# Patient Record
Sex: FEMALE | Race: WHITE | NOT HISPANIC OR LATINO
[De-identification: names, ages, dates, MRNs, and addresses within clinical notes are randomized per-mention and may not be internally consistent; named-entity substitution may affect disease eponyms.]

---

## 2017-08-06 ENCOUNTER — RESULT CHARGE (OUTPATIENT)
Age: 22
End: 2017-08-06

## 2017-08-08 ENCOUNTER — OUTPATIENT (OUTPATIENT)
Dept: OUTPATIENT SERVICES | Age: 22
LOS: 1 days | Discharge: HOME | End: 2017-08-08

## 2017-08-14 ENCOUNTER — APPOINTMENT (OUTPATIENT)
Dept: PEDIATRIC CARDIOLOGY | Facility: CLINIC | Age: 22
End: 2017-08-14
Payer: COMMERCIAL

## 2017-08-14 VITALS
BODY MASS INDEX: 18.44 KG/M2 | HEART RATE: 69 BPM | HEIGHT: 59.25 IN | RESPIRATION RATE: 16 BRPM | DIASTOLIC BLOOD PRESSURE: 65 MMHG | WEIGHT: 91.49 LBS | OXYGEN SATURATION: 100 % | SYSTOLIC BLOOD PRESSURE: 102 MMHG

## 2017-08-14 PROCEDURE — 93320 DOPPLER ECHO COMPLETE: CPT

## 2017-08-14 PROCEDURE — 93303 ECHO TRANSTHORACIC: CPT

## 2017-08-14 PROCEDURE — 99215 OFFICE O/P EST HI 40 MIN: CPT | Mod: 25

## 2017-08-14 PROCEDURE — 93000 ELECTROCARDIOGRAM COMPLETE: CPT

## 2017-08-14 PROCEDURE — 93325 DOPPLER ECHO COLOR FLOW MAPG: CPT

## 2018-04-12 DIAGNOSIS — R00.2 PALPITATIONS: ICD-10-CM

## 2018-08-25 ENCOUNTER — RESULT CHARGE (OUTPATIENT)
Age: 23
End: 2018-08-25

## 2018-08-27 ENCOUNTER — APPOINTMENT (OUTPATIENT)
Dept: PEDIATRIC CARDIOLOGY | Facility: CLINIC | Age: 23
End: 2018-08-27
Payer: COMMERCIAL

## 2018-08-27 ENCOUNTER — APPOINTMENT (OUTPATIENT)
Dept: PEDIATRIC CARDIOLOGY | Facility: CLINIC | Age: 23
End: 2018-08-27

## 2018-08-27 VITALS
HEIGHT: 59.06 IN | DIASTOLIC BLOOD PRESSURE: 64 MMHG | BODY MASS INDEX: 18.53 KG/M2 | HEART RATE: 87 BPM | OXYGEN SATURATION: 97 % | SYSTOLIC BLOOD PRESSURE: 98 MMHG | WEIGHT: 91.93 LBS | RESPIRATION RATE: 18 BRPM

## 2018-08-27 DIAGNOSIS — Z00.00 ENCOUNTER FOR GENERAL ADULT MEDICAL EXAMINATION W/OUT ABNORMAL FINDINGS: ICD-10-CM

## 2018-08-27 PROCEDURE — 93325 DOPPLER ECHO COLOR FLOW MAPG: CPT

## 2018-08-27 PROCEDURE — 93000 ELECTROCARDIOGRAM COMPLETE: CPT

## 2018-08-27 PROCEDURE — 93320 DOPPLER ECHO COMPLETE: CPT

## 2018-08-27 PROCEDURE — 99215 OFFICE O/P EST HI 40 MIN: CPT | Mod: 25

## 2018-08-27 PROCEDURE — 93303 ECHO TRANSTHORACIC: CPT

## 2019-08-16 ENCOUNTER — APPOINTMENT (OUTPATIENT)
Dept: PEDIATRIC CARDIOLOGY | Facility: CLINIC | Age: 24
End: 2019-08-16
Payer: COMMERCIAL

## 2019-08-16 VITALS
HEART RATE: 73 BPM | SYSTOLIC BLOOD PRESSURE: 107 MMHG | BODY MASS INDEX: 19.56 KG/M2 | DIASTOLIC BLOOD PRESSURE: 69 MMHG | WEIGHT: 97 LBS | HEIGHT: 59 IN

## 2019-08-16 PROCEDURE — 93303 ECHO TRANSTHORACIC: CPT

## 2019-08-16 PROCEDURE — 99215 OFFICE O/P EST HI 40 MIN: CPT | Mod: 25

## 2019-08-16 PROCEDURE — 93000 ELECTROCARDIOGRAM COMPLETE: CPT

## 2019-08-16 PROCEDURE — 93320 DOPPLER ECHO COMPLETE: CPT

## 2019-08-16 PROCEDURE — 93325 DOPPLER ECHO COLOR FLOW MAPG: CPT

## 2019-08-16 RX ORDER — ASPIRIN 81 MG/1
81 TABLET, CHEWABLE ORAL DAILY
Refills: 5 | Status: ACTIVE | COMMUNITY
Start: 2019-08-16

## 2019-08-16 NOTE — PHYSICAL EXAM
[General Appearance - Alert] : alert [General Appearance - In No Acute Distress] : in no acute distress [General Appearance - Well Nourished] : well nourished [DiGeorge Syndrome] : DiGeorge Syndrome [Sclera] : the conjunctiva were normal [Examination Of The Oral Cavity] : mucous membranes were moist and pink [Respiration, Rhythm And Depth] : normal respiratory rhythm and effort [Auscultation Breath Sounds / Voice Sounds] : breath sounds clear to auscultation bilaterally [No Cough] : no cough [Chest Surgical / Traumatic Scar] : chest incision well healed [Heart Rate And Rhythm] : normal heart rate and rhythm [Apical Impulse] : quiet precordium with normal apical impulse [Normal S1] : normal  [S2 Fixed Splitting] : had fixed splitting [Systolic] : systolic [II] : a grade 2/6 [LUSB] : LUSB [Ejection] : ejection [Abdomen Soft] : soft [Nondistended] : nondistended [Abdomen Tenderness] : non-tender [] : no hepato-splenomegaly [Nail Clubbing] : no clubbing  or cyanosis of the fingers [Skin Color & Pigmentation] : normal skin color and pigmentation [Demonstrated Behavior - Infant Nonreactive To Parents] : interactive [Mood] : mood and affect were appropriate for age

## 2019-08-16 NOTE — REASON FOR VISIT
[Follow-Up] : a follow-up visit for [Tetralogy Of Fallot] : Tetralogy of Fallot [Mother] : mother [Patient] : patient [FreeTextEntry1] : Torrie Sherman [FreeTextEntry3] : s/p PV replacement, bilateral PA stents

## 2019-08-16 NOTE — CARDIOLOGY SUMMARY
[Today's Date] : [unfilled] [FreeTextEntry1] : NSR, vent rate 66 bpm, RBBB (QRSd 148 ms) [FreeTextEntry2] : 1. Tetralogy of Fallot, status post repair, status post surgical pulmonary valve replacement with bovine\par pericardial valve.\par 2. Mild tricuspid valve regurgitation, peak systolic instantaneous gradient 24.0 mmHg.\par 3. Trivial mitral valve regurgitation.\par 4. Overall normal left ventricle systolic function with good free wall excursion, and hypokinetic/dyskinetic\par septal movement.\par 5. Normal right ventricular morphology with qualitatively normal size and systolic function.\par 6. No residual ventricular septal defect.\par 7. Mild+ aortic valve regurgitation.\par 8. No evidence of neopulmonary stenosis.\par 9. Trivial neopulmonary regurgitation.\par 10. Status post stent placement in proximal right pulmonary artery and status post stent placement in\par proximal left pulmonary artery.\par 11. The proximal branch pulmonary arteries appear widely patent; the distal RPA appears to narrow with\par flow acceleration at that point (gradient obtained unreliable). Distal LPA not well visualized.\par 12. No pericardial effusion.

## 2019-08-16 NOTE — DISCUSSION/SUMMARY
[Needs SBE Prophylaxis] : [unfilled]  needs bacterial endocarditis prophylaxis. SBE prophylaxis is indicated for dental and invasive ENT procedures. (Circulation. 2007; 116: 6078-5253) [FreeTextEntry1] : In summary, Shanti is a 24 year old with DiGeorge syndrome and tetralogy of Fallot status post repair.  While she has had multiple bilateral stent and balloon angioplasty procedures to her bilateral branch pulmonary arteries, her echocardiogram demonstrates good proximal pulmonary flow.  Her pulmonary valve shows good function without stenosis and not much insufficiency.  She has normal estimated pulmonary artery pressures.  She has had no significant arrhythmia in recent years.  \par \par We reviewed the importance of excellent dental hygeine and SBE prophylaxis.  Additionally, she should continue Aspirin 81 mg once daily for valve thromboprophylaxis.  It has been quite a number of years since we last evaluated her differentinal pulmonary artery flow.  We will refer her prior imaging with our congenital MRI colleague and see if there would be a lot of stent and valve artifact to preclude reliable measurements.  If that is the case, we will obtain a nuclear medicine study to evaluate differential flow.  Finally, we encouraged Shanti to increase the amount of aerobic activity she does.  \par \par Follow up in 1 year, sooner if any concerns or questions arise.  [Influenza vaccine is recommended] : Influenza vaccine is recommended

## 2019-08-16 NOTE — REVIEW OF SYSTEMS
[Loss Of Hearing] : hearing loss [Headache] : headache [Anxiety] : anxiety [Feeling Poorly] : not feeling poorly (malaise) [Wgt Loss (___ Lbs)] : no recent weight loss [Cyanosis] : no cyanosis [Edema] : no edema [Chest Pain] : no chest pain or discomfort [Diaphoresis] : not diaphoretic [Exercise Intolerance] : no persistence of exercise intolerance [Orthopnea] : no orthopnea [Palpitations] : no palpitations [Fast HR] : no tachycardia [Tachypnea] : not tachypneic [Cough] : no cough [Shortness Of Breath] : not expressed as feeling short of breath [Fainting (Syncope)] : no fainting [Dizziness] : no dizziness [Rash] : no rash [Easy Bruising] : no tendency for easy bruising [Easy Bleeding] : no ~M tendency for easy bleeding [Nosebleeds] : no epistaxis [Sleep Disturbances] : ~T no sleep disturbances [Depression] : no depression

## 2019-08-16 NOTE — CONSULT LETTER
[Today's Date] : [unfilled] [Name] : Name: [unfilled] [] : : ~~ [Today's Date:] : [unfilled] [Dear  ___:] : Dear Dr. [unfilled]: [Sincerely,] : Sincerely, [Consult - Multiple Provider] : Thank you very much for allowing us to participate in the care of this patient. If you have any questions, please do not hesitate to contact us. [FreeTextEntry4] : Dr. Obrien [FreeTextEntry5] : [address] [FreeTextEntry6] : [place] [address] [de-identified] : Kayleigh Siddiqui, MSN, CPNP-AC, PC\par Pediatric Cardiology, Adult Congenital Cardiology\par [facility]\par \par Delmar López MD\par Pediatric Cardiology\par Adult Congenital Heart Disease\par  of Pediatrics\par [employer] [facility] [employer] at [facility]\par

## 2019-08-16 NOTE — HISTORY OF PRESENT ILLNESS
[FreeTextEntry1] : We had the pleasure of seeing Shanti in the Adult Congenital [facility] Program of [facility] on August 16, 2019 for follow up.  As you know, Shanti is a 24 year old female with DiGeorge syndrome and tetralogy of Fallot who has undergone the following prior interventions:\par \par 1.  1993, [facility]- Central shunt\par 2.  1993, Dr. Argueta, [facility]- repair of TOF with VSD patch, RVOT patch extending to LPA, takedown of central shunt, PDA ligation. The proximal LPA was very stenotic (3mm)\par 3.  1993, [facility]- RPA and LPA repair with pericardial patch augmentation, reduction of RVOT aneurysm\par 4.  2/27/1997, Edinson Acosta & Phi, [facility]- bilateral PA stent placement using transhepatic approach (no venous access, by report bilateral femoral venous occlusions)\par 5.  5 years of age, Dr. Willoughby, [facility]- RPA stent dilation\par 6.  2004, [facility]- RPA stent dilation\par 7.  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  12/7/2009, Dr. Watkins, [facility]- 27 mm bovine pericardial stented bioprosthetic pulmonary valve replacement\par 9.   7/28/2010, Dr. Garcia, [facility]- repeat EP study with no inducible VT\par \par She was from infancy into young adulthood by Dr. Corona and transitioned her care to Dr. Dumont in 2014.  We last saw her for follow up in our [facility] clinic in August 2018 at which time she was doing well.  She has not had any recent arrhythmia concerns and denies palpitations, shortness of breath, chest pain, near syncope or syncope. 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  She does not regularly exercise.  She continues on Aspirin daily with no bleeding issues.  She takes SBE prophylaxis prior to seeing the dentist.  She takes Zoloft for a history of anxiety. 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

## 2019-11-21 ENCOUNTER — OUTPATIENT (OUTPATIENT)
Dept: OUTPATIENT SERVICES | Age: 24
LOS: 1 days | End: 2019-11-21

## 2019-11-21 ENCOUNTER — APPOINTMENT (OUTPATIENT)
Dept: MRI IMAGING | Facility: HOSPITAL | Age: 24
End: 2019-11-21
Payer: COMMERCIAL

## 2019-11-21 DIAGNOSIS — Q21.3 TETRALOGY OF FALLOT: ICD-10-CM

## 2019-11-21 PROCEDURE — 75561 CARDIAC MRI FOR MORPH W/DYE: CPT | Mod: 26

## 2019-11-21 PROCEDURE — 75565 CARD MRI VELOC FLOW MAPPING: CPT | Mod: 26

## 2019-11-21 PROCEDURE — 71555 MRI ANGIO CHEST W OR W/O DYE: CPT | Mod: 26

## 2020-08-17 DIAGNOSIS — Z95.2 PRESENCE OF PROSTHETIC HEART VALVE: ICD-10-CM

## 2020-08-23 ENCOUNTER — RESULT CHARGE (OUTPATIENT)
Age: 25
End: 2020-08-23

## 2020-08-24 ENCOUNTER — APPOINTMENT (OUTPATIENT)
Dept: PEDIATRIC CARDIOLOGY | Facility: CLINIC | Age: 25
End: 2020-08-24
Payer: COMMERCIAL

## 2020-08-24 VITALS
RESPIRATION RATE: 18 BRPM | SYSTOLIC BLOOD PRESSURE: 124 MMHG | HEIGHT: 59.45 IN | WEIGHT: 96.56 LBS | DIASTOLIC BLOOD PRESSURE: 76 MMHG | OXYGEN SATURATION: 98 % | BODY MASS INDEX: 19.21 KG/M2 | HEART RATE: 75 BPM

## 2020-08-24 VITALS
DIASTOLIC BLOOD PRESSURE: 76 MMHG | HEIGHT: 59.45 IN | WEIGHT: 96.56 LBS | OXYGEN SATURATION: 98 % | SYSTOLIC BLOOD PRESSURE: 124 MMHG | BODY MASS INDEX: 19.21 KG/M2 | HEART RATE: 75 BPM | RESPIRATION RATE: 18 BRPM

## 2020-08-24 PROCEDURE — 93320 DOPPLER ECHO COMPLETE: CPT

## 2020-08-24 PROCEDURE — 93000 ELECTROCARDIOGRAM COMPLETE: CPT

## 2020-08-24 PROCEDURE — 99215 OFFICE O/P EST HI 40 MIN: CPT

## 2020-08-24 PROCEDURE — 93325 DOPPLER ECHO COLOR FLOW MAPG: CPT

## 2020-08-24 PROCEDURE — 93303 ECHO TRANSTHORACIC: CPT

## 2020-08-26 NOTE — CARDIOLOGY SUMMARY
[de-identified] : 8/24/2020 [FreeTextEntry2] : Summary:\par 1. Tetralogy of Fallot, status post repair, status post surgical pulmonary valve replacement with bovine\par pericardial valve.\par 2. Status post stent placement in proximal right pulmonary artery and status post stent placement in\par proximal left pulmonary artery.\par 3. No residual ventricular septal defect.\par 4. Mild tricuspid valve regurgitation, peak systolic instantaneous gradient 25.0 mmHg.\par 5. Normal right ventricular morphology with qualitatively normal size and systolic function.\par 6. No evidence of neopulmonary stenosis.\par 7. Trivial neopulmonary regurgitation.\par 8. Trivial mitral valve regurgitation.\par 9. Overall normal left ventricle systolic function with good free wall excursion, and hypokinetic/dyskinetic\par septal movement.\par 10. Mild+ aortic valve regurgitation.\par 11. No pericardial effusion. [FreeTextEntry1] : normal sinus rhythm \par right bundle branch block (QRSd 154 ms)\par prolonged QT 2/2 RBBB\par when compared to prior, no change is seen [de-identified] : 8/24/2020 [de-identified] : 12/2009 nuclear perfusion [de-identified] : CARDIAC FINDINGS: \par Visceral situs solitus \par Cardiac Segments:  { S, D, S  } \par Cardiac Position: Levocardia \par \par No residual ventricular septal defect. There is an outpouching in the anterior free wall of the right ventricular outflow tract that is hypokinetic. Artifact from the prosthetic pulmonary valve. Trivial pulmonary regurgitation with a regurgitant fraction of 5% by PC flows. \par \par Continuous branch pulmonary arteries. Stents in bilateral branch pulmonary arteries which appear patent. The distal branch pulmonary arteries are of larger caliber than the stented portions. The tertiary branches of the right pulmonary artery appears prominent and dilated. Estimated differential flow to the lungs is 66.7% to the right and 33.3% to the left. \par \par Normal left ventricular and right ventricular volumes. Mildly decreased left ventricular ejection fraction (LVEF: 50.3%; z: -2.79) and mild to moderately decreased right ventricular ejection fraction (RVEF: 41.9 %; z: -3.58).\par  \par Normal left ventricular mass. Hyperenhancement in the anterior free wall of the RVOT. Otherwise, negative myocardial delayed enhancement. Normal right and left atria. No evidence of mitral and tricuspid regurgitation on cine imaging. \par \par Left aortic arch with normal branching pattern. Stump of the central shunt seen on the undersurface of the aortic arch. Tricommissural aortic valve. \par \par Moderately dilated aortic valve (z: 5.06 taking a maximal dimension of 3.68 cm from sinus to commissure in systole). Trivial aortic regurgitation with a regurgitant fraction of 7% by PC flows. \par \par Normal systemic and pulmonary venous connections. No pericardial effusion. \par \par PC imaging demonstrates normal cardiac output, trivial aortic regurgitation (RF: 7%), trivial pulmonary regurgitation (RF: 5%) and no significant shunt with a Qp/Qs of 0.99:1. Estimated QpR: QpL is 66.7%: 33.3%. \par \par LVEDV (ml)/ (Indexed to BSA): 114.9/84.4 (z: 0.62; *Mean+/-SD: 77.7+/-10.8) \par LVESV (ml)/ (Indexed to BSA): 57.1/ 41.9 \par LVSV (ml)/ (Indexed to BSA): 87.8/42.4 \par LV EF (%): 50.3 (z: -2.79; *Mean+/-SD: 64+/-4.9) \par LV CO (l/min): 4.69 \par LV CI (l/min/m2): 3.44 \par LV mass/ (Indexed to BSA) (gm): 62.4/45.8 (z: -0.84; *Mean+/-SD: 52+/-7.4) \par \par RVEDV (ml)/ (Indexed to BSA): 123.1/ 90.4 (z: 1.10; *Mean+/-SD: 75.2+/-13.6) \par RVESV (ml)/ (Indexed to BSA): 71.5/52.5 \par RVSV (ml)/ (Indexed to BSA): 51.6/37.9 \par RVEF (%): 41.9 (z: -3.58; *Mean+/-SD: 59.8+/-5) \par RV CO (l/min): 4.18 \par RV CI (l/min/m2): 3.07 \par \par \par Flow Data \par Aortic root (L/min) (total/effective): 5.98/5.59 \par Ascending aorta (L/min) (total/effective): 5.20/4.81 \par Main Pulmonary Artery (L/min) (total/effective): 5.05/4.78 \par Right Pulmonary Artery (L/min) (total/effective): 3.38/3.20 \par Left Pulmonary Artery (L/min) (total/effective): 1.65/1.60 \par \par Aortic root (mL) (total/effective): 64.87/60.67 \par Ascending aorta (mL) (total/effective): 57.41/53.13 \par Main Pulmonary Artery (mL) (total/effective): 56/53.05 \par Right Pulmonary Artery (mL) (total/effective): 37.37/35.64 \par Left Pulmonary Artery (mL) (total/effective): 19.77/19.66 \par \par Flow Fractions \par Pulmonary Regurgitation Fraction (%): 5 \par Aortic Regurgitant Fraction (%): 7 \par \par \par Measurements: \par Ascending Aorta at level of RPA: 2.9 x 2.9 cm \par Main Pulmonary Artery: 2.2 x 3.0 cm; Perimeter: 84.15 mm; Area: 5.24 sq. cm \par Right Pulmonary Artery: 1.3 x 2.6 cm (distal to the stent) \par Left Pulmonary Artery: 1.3 x 1.5 cm (distal to the stent) \par \par Cine SSFP (short axis) \par Aortic root (systole): 3.32 x 3.68 x 3.63 cm \par \par LVOT view: \par Aortic root (systole):3.88 cm  [de-identified] : 11/2019 [de-identified] : COMPARISON: Quantitative lung scan dated 09/01/2009. \par FINDINGS: There is diffusely decreased tracer uptake in the left lung, worse \par in the upper lung, which is not significantly changed compared to the prior \par study. \par Differential perfusion: Left lung 24% (previously 26%); Right lung 76 % \par (previously 70%) \par IMPRESSION: Abnormal quantitative lung perfusion scan. \par Diffusely decreased perfusion to the entire left lung, especially the upper \par lung. \par Differential perfusion: Left lung 24% (previously 26%); Right lung 76 % \par (previously 70%) \par No significant change in comparison to the quantitative lung scan of \par 09/01/2009.

## 2020-08-26 NOTE — REASON FOR VISIT
[Follow-Up] : a follow-up visit for [Tetralogy Of Fallot] : Tetralogy of Fallot [Patient] : patient [FreeTextEntry3] : s/p PV replacement, bilateral PA stents [FreeTextEntry1] : Torrie Sherman

## 2020-08-26 NOTE — DISCUSSION/SUMMARY
[Needs SBE Prophylaxis] : [unfilled]  needs bacterial endocarditis prophylaxis. SBE prophylaxis is indicated for dental and invasive ENT procedures. (Circulation. 2007; 116: 2439-9380) [PE + No Restrictions] : [unfilled] may participate in the entire physical education program without restriction, including all varsity competitive sports. [Influenza vaccine is recommended] : Influenza vaccine is recommended [FreeTextEntry1] : In summary, Shanti is a 25 year old with DiGeorge syndrome and a tetralogy of Fallot status post initial repair followed by stent angioplasty to the right and left PA branches and subsequent pulmonary valve replacement in 2008.  Prior to her valve replacement, she had inducible VT on EP study, however, on follow up testing no VT was induced after pulmonary valve replacement.  Her MRI shows normal right ventricular volumes with mildly decreased right ventricular function.  While there is discrepant flow to the right versus left lung, this is improved from her last nuclear perfusion scan in 2009. By echocardiogram, her valve is mildly stenotic and minimally regurgitant.  As aorta is dilated, as is commonly seen in this congenital heart defect. There is mild aortic insufficiency and no stenosis. No intervention or changes in her medical management are necessary at this point.\par \par We placed a Holter today to screen for occult arrhythmia.\par \par She should continue Aspirin 81 mg once daily given her prosthetic valve. SBE prophylaxis is indicated as is excellent dental hygiene. \par \par We recommended a formal consultation with an immunologist given the presence of DiGeorge syndrome and her reported difficulty in clearing infections in the cold/flu season. Additionally, this would be helpful to determine if there is any inherent immune dysfunction that would place her at increased risk to contract COVID.  \par \par Follow up in one year, sooner if any concerns arise.  We will consider an exercise stress test with gas analysis at the next visit.

## 2020-08-26 NOTE — CLINICAL NARRATIVE
[Up to Date] : Up to Date [FreeTextEntry2] : JOSE ISLAS is a  25 year who  arrives for follow up  . As per patient no Hx of symptoms referable to the cardiovascular system, taking meds as prescribed. \par

## 2020-08-26 NOTE — CONSULT LETTER
[Today's Date] : [unfilled] [Name] : Name: [unfilled] [] : : ~~ [Today's Date:] : [unfilled] [____:] :  [unfilled]: [Sincerely,] : Sincerely, [Consult - Multiple Provider] : Thank you very much for allowing us to participate in the care of this patient. If you have any questions, please do not hesitate to contact us. [DrRadha  ___] : Dr. SINHA [FreeTextEntry5] : [address] [FreeTextEntry4] : [facility]  [de-identified] : Kayleigh Siddiqui, MSN, CPNP-AC, PC\par Pediatric Cardiology, Adult Congenital Cardiology\par [facility]\par \par Delmar López MD\par Pediatric Cardiology\par Adult Congenital Heart Disease\par  of Pediatrics\par [employer] [facility] [employer] at [facility]\par  [FreeTextEntry6] : [facility] [address] [FreeTextEntry7] : phone number [phone]

## 2020-08-26 NOTE — HISTORY OF PRESENT ILLNESS
[FreeTextEntry1] : We had the pleasure of seeing Shanti in the Adult Congenital Heart Program of [facility] on August 24, 2020 for follow up.  As you know, Shanti is a 25 year old female with DiGeorge syndrome and tetralogy of Fallot who has undergone the following prior interventions:\par \par 1.  1993, [facility]- Central shunt\par 2.  1993, Dr. Argueta, [facility]- repair of TOF with VSD patch, RVOT patch extending to LPA, takedown of central shunt, PDA ligation. The proximal LPA was very stenotic (3mm)\par 3.  1993, [facility]- RPA and LPA repair with pericardial patch augmentation, reduction of RVOT aneurysm\par 4.  2/27/1997, Edinson Acosta & Phi, [facility]- bilateral PA stent placement using transhepatic approach (no venous access, by report bilateral femoral venous occlusions)\par 5.  5 years of age, Dr. Willoughby, [facility]- RPA stent dilation\par 6.  2004, [facility]- RPA stent dilation\par 7.  11/30/2009, Dainela (cath)Radha (EPS), 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  12/7/2009, Dr. Watkins, [facility]- 27 mm bovine pericardial stented bioprosthetic pulmonary valve replacement\par 9.   7/28/2010, Dr. Garcia, [facility]- repeat EP study with no inducible VT\par \par She was from infancy into young adulthood by Dr. Corona and transitioned her care to Dr. Dumont in 2014.  She was lasts seen in [facility] clinic last year. After that visit, we recommended a cardiac MRI which she had in the fall (see results below).\par \par She was working as  but recently was advised to take the year off due to COVID by her employer.  Given her DiGeorge syndrome, she thinks she was evaluated in the past by immunology and no particular concerns existed. She does report when she gets a cold, it takes her two or three months to clear it.  \par \par  She continues on Aspirin daily with no bleeding issues.  She takes SBE prophylaxis prior to seeing the dentist.  She takes Zoloft for a history of anxiety. 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 She denies chest pain, palpitations, shortness of breath, dyspnea on exertion, near syncope, or syncope.

## 2020-08-26 NOTE — REVIEW OF SYSTEMS
[Anxiety] : anxiety [Depression] : depression [Fever] : no fever [Feeling Poorly] : not feeling poorly (malaise) [Wgt Loss (___ Lbs)] : no recent weight loss [Pallor] : not pale [Change in Vision] : no change in vision [Cyanosis] : no cyanosis [Loss Of Hearing] : no hearing loss [Diaphoresis] : not diaphoretic [Chest Pain] : no chest pain or discomfort [Edema] : no edema [Palpitations] : no palpitations [Orthopnea] : no orthopnea [Exercise Intolerance] : no persistence of exercise intolerance [Fast HR] : no tachycardia [Tachypnea] : not tachypneic [Shortness Of Breath] : not expressed as feeling short of breath [Wheezing] : no wheezing [Cough] : no cough [Fainting (Syncope)] : no fainting [Decrease In Appetite] : appetite not decreased [Joint Pains] : no arthralgias [Dizziness] : no dizziness [Easy Bruising] : no tendency for easy bruising [Rash] : no rash [Easy Bleeding] : no ~M tendency for easy bleeding [Sleep Disturbances] : ~T no sleep disturbances [Nosebleeds] : no epistaxis [Hyperactive] : no hyperactive behavior [Heat/Cold Intolerance] : no temperature intolerance [Dec Urine Output] : no oliguria

## 2021-08-16 ENCOUNTER — APPOINTMENT (OUTPATIENT)
Dept: PEDIATRIC CARDIOLOGY | Facility: CLINIC | Age: 26
End: 2021-08-16
Payer: COMMERCIAL

## 2021-08-16 VITALS
DIASTOLIC BLOOD PRESSURE: 74 MMHG | HEIGHT: 59.45 IN | BODY MASS INDEX: 19.3 KG/M2 | WEIGHT: 97 LBS | HEART RATE: 86 BPM | OXYGEN SATURATION: 98 % | SYSTOLIC BLOOD PRESSURE: 118 MMHG

## 2021-08-16 PROCEDURE — 93320 DOPPLER ECHO COMPLETE: CPT

## 2021-08-16 PROCEDURE — 93000 ELECTROCARDIOGRAM COMPLETE: CPT

## 2021-08-16 PROCEDURE — 99214 OFFICE O/P EST MOD 30 MIN: CPT

## 2021-08-16 PROCEDURE — 93303 ECHO TRANSTHORACIC: CPT

## 2021-08-16 PROCEDURE — 93325 DOPPLER ECHO COLOR FLOW MAPG: CPT

## 2021-08-24 ENCOUNTER — NON-APPOINTMENT (OUTPATIENT)
Age: 26
End: 2021-08-24

## 2021-09-01 NOTE — REASON FOR VISIT
[Follow-Up] : a follow-up visit for [Tetralogy Of Fallot] : Tetralogy of Fallot [Mother] : mother [FreeTextEntry3] : Di [place] Syndrome, PV replacement, bilateral PA stents

## 2021-09-01 NOTE — DISCUSSION/SUMMARY
[Needs SBE Prophylaxis] : [unfilled]  needs bacterial endocarditis prophylaxis. SBE prophylaxis is indicated for dental and invasive ENT procedures. (Circulation. 2007; 116: 8063-4457) [FreeTextEntry1] : In summary, Shanti is a 26 year old with DiGeorge syndrome and a tetralogy of Fallot status post initial repair followed by stent angioplasty to the right and left PA branches and subsequent pulmonary valve replacement in 2008. Prior to her valve replacement, she had inducible VT on EP study, however, on follow up testing no VT was induced after pulmonary valve replacement. \par \par  By echocardiogram, her valve is mildly stenotic and minimally regurgitant. Her RV pressure is low. Her aorta is dilated, as is commonly seen in this congenital heart defect. There is mild aortic insufficiency and no stenosis. No intervention or changes in her medical management are necessary at this point.\par \par A Holter will determine the etiology of her palpitations so we will place on today. While the description of the abrupt cessation with a vagal maneuver leans towards an atrial arrhythmia it is important to document exact arrhythmia if any so we can manage it appropriately.\par \par We would like to exercise her as well and identify if she has any rhythm disturbances during exercise.\par \par In terms of her lightheadedness, she mentions that she drinks very little throughout the day and when she does it is usually coffee or soda. We discussed the mechanism of her lightheadedness and the importance of hydration.\par \par She should continue Aspirin 81 mg once daily given her prosthetic valve. SBE prophylaxis is indicated as is excellent dental hygiene. \par \par Follow up in one year, sooner if any concerns arise\par

## 2021-09-01 NOTE — HISTORY OF PRESENT ILLNESS
[FreeTextEntry1] : We had the pleasure of seeing Shanti in the Adult Congenital Heart Program of [facility] on August 15, 2021 for follow up. As you know, Shanti is a 26 year old female with DiGeorge syndrome and tetralogy of Fallot who has undergone the following prior interventions:\par \par 1. 1993, [facility]- Central shunt\par 2. 1993, Dr. Argueta, [facility]- repair of TOF with VSD patch, RVOT patch extending to LPA, takedown of central shunt, PDA ligation. The proximal LPA was very stenotic (3mm)\par 3. 1993, Dr. Argueta, [facility]- RPA and LPA repair with pericardial patch augmentation, reduction of RVOT aneurysm\par 4. 2/27/1997, [facility] Acosta & Phi, [facility]- bilateral PA stent placement using transhepatic approach (no venous access, by report bilateral femoral venous occlusions)\par 5. 5 years of age, Dr. Willoughby, [facility]- RPA stent dilation\par 6. 2004, [facility]- RPA stent dilation\par 7. 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 12/7/2009, Dr. Watkins, [facility]- 27 mm bovine pericardial stented bioprosthetic pulmonary valve replacement\par 9. 7/28/2010, Dr. Garcia, [facility]- repeat EP study with no inducible VT\par \par She was followed  for many years by  and transitioned her care to our adult congenital program in 2014. \par She had taken much of last year off as a  due to COVID but went back in the spring. She is now off for the summer and will go back to work in the fall. We discussed an immunology consult following our visit last year, especially with her DiGeorge, COVID and her difficulty in clearing infections. Her mother is with her today and says her immune function was never an issue with her [place] syndrome.\par \par She has been feeling well except for some palpitations. It sounds as if they begin suddenly and stop when she coughs. They occur almost daily. They are not associated with chest pain or shortness of breath. She does have frequent lightheadedness associated with position changes.\par \par She takes Aspirin daily with no bleeding issues. She takes SBE prophylaxis prior to seeing the dentist. She takes Zoloft for a history of anxiety. 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 She has been vaccinated with the Pfizer vaccine.\par \par She has been trying to walk for exercise. \par

## 2021-09-01 NOTE — PHYSICAL EXAM
[General Appearance - Alert] : alert [DiGeorge Syndrome] : DiGeorge Syndrome [Sclera] : the sclera were normal [Nasal Cavity] : the nasal mucosa was normal [Auscultation Breath Sounds / Voice Sounds] : breath sounds clear to auscultation bilaterally [Sternotomy] : sternotomy [Well-Healed] : well-healed [Apical Impulse] : quiet precordium with normal apical impulse [Heart Rate And Rhythm] : normal heart rate and rhythm [Normal S1] : normal  [S2 Fixed Splitting] : had fixed splitting [S2 Wide Splitting] : had wide splitting [2+] : left 2+ [1+] : left 1+ [Systolic] : systolic [III] : a grade 3/6   [RUSB] : RUSB [LUSB] : LUSB [Holosystolic] : holosystolic [Back] : the murmur was transmitted to the back [Abdomen Soft] : soft [Palpable___cm below the RCM] : palpable [unfilled] cm below the right costal margin [Motor Tone] : normal muscle strength and tone [Skin Turgor] : normal turgor [Demonstrated Behavior - Infant Nonreactive To Parents] : interactive [FreeTextEntry1] : P

## 2021-09-01 NOTE — CARDIOLOGY SUMMARY
[Today's Date] : [unfilled] [FreeTextEntry1] : NSR, ventricular rate 82 bpm, RBBB [FreeTextEntry2] : Summary:\par 1. Tetralogy of Fallot, status post repair, status post surgical pulmonary valve replacement with bovine pericardial valve.\par 2. Status post stent placement in proximal right pulmonary artery and status post stent placement in\par proximal left pulmonary artery.\par 3. Acceleration of flow velocity across neopulmonary valve up to 2 m/sec.\par 4. Trivial neopulmonary regurgitation.\par 5. No residual ventricular septal defect.\par 6. Overall normal left ventricle systolic function with good free wall excursion, and hypokinetic/dyskinetic septal movement.\par 7. Moderately dilated aortic root.\par 8. Mild aortic valve regurgitation.\par 9. Trivial mitral valve regurgitation.\par 10. Normal right ventricular morphology with qualitatively normal size and systolic function.\par 11. Mild tricuspid valve regurgitation, peak systolic instantaneous gradient 26.6 mmHg.\par 12. No pericardial effusion.

## 2021-09-01 NOTE — CONSULT LETTER
[Today's Date] : [unfilled] [Name] : Name: [unfilled] [] : : ~~ [Today's Date:] : [unfilled] [Dear  ___:] : Dear Dr. [unfilled]: [Consult - Multiple Provider] : Thank you very much for allowing us to participate in the care of this patient. If you have any questions, please do not hesitate to contact us. [Sincerely,] : Sincerely, [FreeTextEntry4] : Dr. Gaurang Oliver [FreeTextEntry5] : [id number] [FreeTextEntry6] : [place] [address] [de-identified] : Kayleigh Siddiqui, MSN, CPNP-AC, PC\par Pediatric Cardiology, Adult Congenital Cardiology\par [facility]\par [facility]\par \par Luis Dumont MD, MILANA\par Medical Director, Adult Congenital Heart Program\par Professor of Pediatrics\par [employer] [facility]\par

## 2022-07-22 ENCOUNTER — APPOINTMENT (OUTPATIENT)
Dept: PEDIATRIC CARDIOLOGY | Facility: CLINIC | Age: 27
End: 2022-07-22

## 2022-09-30 ENCOUNTER — APPOINTMENT (OUTPATIENT)
Dept: PEDIATRIC CARDIOLOGY | Facility: CLINIC | Age: 27
End: 2022-09-30

## 2022-10-10 ENCOUNTER — APPOINTMENT (OUTPATIENT)
Dept: PEDIATRIC CARDIOLOGY | Facility: CLINIC | Age: 27
End: 2022-10-10

## 2022-10-10 VITALS
BODY MASS INDEX: 20.04 KG/M2 | HEART RATE: 99 BPM | DIASTOLIC BLOOD PRESSURE: 79 MMHG | WEIGHT: 100.75 LBS | SYSTOLIC BLOOD PRESSURE: 132 MMHG | HEIGHT: 59.45 IN | RESPIRATION RATE: 18 BRPM | OXYGEN SATURATION: 98 %

## 2022-10-10 PROCEDURE — 93325 DOPPLER ECHO COLOR FLOW MAPG: CPT

## 2022-10-10 PROCEDURE — 93303 ECHO TRANSTHORACIC: CPT

## 2022-10-10 PROCEDURE — 93320 DOPPLER ECHO COMPLETE: CPT

## 2022-10-10 PROCEDURE — 99214 OFFICE O/P EST MOD 30 MIN: CPT | Mod: 25

## 2022-10-10 PROCEDURE — 93000 ELECTROCARDIOGRAM COMPLETE: CPT

## 2022-10-11 NOTE — CONSULT LETTER
[Today's Date] : [unfilled] [Name] : Name: [unfilled] [] : : ~~ [Today's Date:] : [unfilled] [Dear  ___:] : Dear Dr. [unfilled]: [Consult] : I had the pleasure of evaluating your patient, [unfilled]. My full evaluation follows. [Consult - Single Provider] : Thank you very much for allowing me to participate in the care of this patient. If you have any questions, please do not hesitate to contact me. [Sincerely,] : Sincerely, [FreeTextEntry4] : Dr. Gaurang Oliver MD [FreeTextEntry5] : [id number] [FreeTextEntry6] : [place] [address] [FreeTextEntry7] : [phone]

## 2022-10-11 NOTE — DISCUSSION/SUMMARY
[Needs SBE Prophylaxis] : [unfilled]  needs bacterial endocarditis prophylaxis. SBE prophylaxis is indicated for dental and invasive ENT procedures. (Circulation. 2007; 116: 8376-3955) [FreeTextEntry1] : In summary, Shanti is a 27 year old with DiGeorge syndrome and a tetralogy of Fallot status post initial repair followed by stent angioplasty to the right and left PA branches and subsequent pulmonary valve replacement in 2008. Prior to her valve replacement, she had inducible VT on EP study, however, on follow up testing no VT was induced after pulmonary valve replacement. \par \par Her echocardiogram continues to have a pulmonary valve that is mildly stenotic and minimally regurgitant. Her RV pressure remains low. There is mild aortic insufficiency and mild dilation of the aorta that is common in patients with this diagnosis. No intervention or changes in her medical management are necessary at this point.\par \par She does not require any additional imaging at this time. We discussed that we would like her to have an exercise stress test with gas analysis.She can do this next summer she is off from school as there is no urgency.\par \par We will see her in one year but remain available to see her sooner if clinically indicated.\par \par

## 2022-10-11 NOTE — CARDIOLOGY SUMMARY
[Today's Date] : [unfilled] [FreeTextEntry1] : NSR, ventricular rate 91 bpm, RBBB [FreeTextEntry2] : Summary:\par 1. Tetralogy of Fallot, status post repair, status post surgical pulmonary valve replacement with bovine pericardial valve.\par 2. Status post patch closure of anterior malalignment ventricular septal defect.\par 3. Status post stent placement in proximal right pulmonary artery and status post stent placement in\par proximal left pulmonary artery.\par 4. No residual ventricular septal defect.\par 5. Normal right ventricular morphology with qualitatively normal size and systolic function.\par 6. Acceleration of flow velocity across neopulmonary valve up to 2 m/sec.\par 7. Trivial neopulmonary regurgitation.\par 8. Flow seen in the proximal area of both branch pulmonary artery stents.\par 9. Trivial mitral valve regurgitation.\par 10. Overall normal left ventricle systolic function with good free wall excursion, and hypokinetic/dyskinetic septal movement.\par 11. Mild aortic valve regurgitation.\par 12. Moderately dilated aortic root.\par 13. No pericardial effusion.

## 2022-10-11 NOTE — REVIEW OF SYSTEMS
[Feeling Poorly] : not feeling poorly (malaise) [Cyanosis] : no cyanosis [Exercise Intolerance] : no persistence of exercise intolerance [Palpitations] : no palpitations [Orthopnea] : no orthopnea [Cough] : no cough [Shortness Of Breath] : not expressed as feeling short of breath [Fainting (Syncope)] : no fainting [Headache] : no headache [Dizziness] : no dizziness [Easy Bruising] : no tendency for easy bruising [Easy Bleeding] : no ~M tendency for easy bleeding [Sleep Disturbances] : ~T no sleep disturbances [Depression] : no depression

## 2022-10-11 NOTE — REASON FOR VISIT
[Follow-Up] : a follow-up visit for [Tetralogy Of Fallot] : Tetralogy of Fallot [Patient] : patient [Mother] : mother [Medical Records] : medical records [FreeTextEntry3] : DiGeorge Syndrome

## 2022-10-11 NOTE — HISTORY OF PRESENT ILLNESS
[FreeTextEntry1] : We had the pleasure of seeing Shanti in the Adult Congenital Heart Program of [facility] on October 10, 2022 for follow up. As you know, Shanti is a 27 year old female with DiGeorge syndrome and tetralogy of Fallot who has undergone the following prior interventions:\par \par 1. 1993, [facility]- Central shunt\par 2. 1993, Dr. Argueta, [facility]- repair of TOF with VSD patch, RVOT patch extending to LPA, takedown of central shunt, PDA ligation. The proximal LPA was very stenotic (3mm)\par 3. 1993, [facility]- RPA and LPA repair with pericardial patch augmentation, reduction of RVOT aneurysm\par 4. 2/27/1997, [facility] Acosta [facility] Phi, [facility]- bilateral PA stent placement using transhepatic approach (no venous access, by report bilateral femoral venous occlusions)\par 5. 5 years of age, Dr. Willoughby, [facility]- RPA stent dilation\par 6. 2004, [facility]- RPA stent dilation\par 7. 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 12/7/2009, Dr. Watkins, [facility]- 27 mm bovine pericardial stented bioprosthetic pulmonary valve replacement\par 9. 7/28/2010, Dr. Garcia, [facility]- repeat EP study with no inducible VT\par \par She wore a Holter last year which was normal. Her interim history is significant for COVID with symptoms of fatigue and headache. She is vaccinated and received a booster injection. She is back to work as a  in a .\par \par From a cardiovascular perspective,  she has no complaints of chest pain, palpitations, shortness of breath, peripheral edema, dizziness or syncope.\par She remains on Aspirin with no bleeding or bruising.\par \par \par

## 2022-10-11 NOTE — PHYSICAL EXAM
[General Appearance - Alert] : alert [Facies] : the head and face were normal in appearance [Sclera] : the sclera were normal [Auscultation Breath Sounds / Voice Sounds] : breath sounds clear to auscultation bilaterally [Sternotomy] : sternotomy [Well-Healed] : well-healed [Apical Impulse] : quiet precordium with normal apical impulse [Heart Rate And Rhythm] : normal heart rate and rhythm [Normal S1] : normal  [S2 Fixed Splitting] : had fixed splitting [S2 Wide Splitting] : had wide splitting [2+] : left 2+ [1+] : left 1+ [Systolic] : systolic [III] : a grade 3/6   [LUSB] : LUSB [Ejection] : ejection [Abdomen Soft] : soft [] : no hepato-splenomegaly [Nail Clubbing] : no clubbing  or cyanosis of the fingers [Delayed Developmental Milestones] : normal neurologic development for age [Skin Color & Pigmentation] : normal skin color and pigmentation [Demonstrated Behavior - Infant Nonreactive To Parents] : interactive [FreeTextEntry1] : hemihypertrophy of the left chest

## 2022-10-21 ENCOUNTER — APPOINTMENT (OUTPATIENT)
Dept: PEDIATRIC CARDIOLOGY | Facility: CLINIC | Age: 27
End: 2022-10-21

## 2022-10-28 ENCOUNTER — APPOINTMENT (OUTPATIENT)
Dept: PEDIATRIC CARDIOLOGY | Facility: CLINIC | Age: 27
End: 2022-10-28

## 2023-03-10 NOTE — PHYSICAL EXAM
Pt advised she would have to be seen by Dr Draper for evaluation; pt scheduled on 3/13/2023   [General Appearance - In No Acute Distress] : in no acute distress [General Appearance - Alert] : alert [General Appearance - Well Nourished] : well nourished [DiGeorge Syndrome] : DiGeorge Syndrome [General Appearance - Well Developed] : well developed [Sclera] : the conjunctiva were normal [Respiration, Rhythm And Depth] : normal respiratory rhythm and effort [Auscultation Breath Sounds / Voice Sounds] : breath sounds clear to auscultation bilaterally [No Cough] : no cough [Apical Impulse] : quiet precordium with normal apical impulse [Chest Surgical / Traumatic Scar] : chest incision well healed [Heart Sounds Gallop] : no gallops [Heart Rate And Rhythm] : normal heart rate and rhythm [Heart Sounds Pericardial Friction Rub] : no pericardial rub [Arterial Pulses] : normal upper and lower extremity pulses with no pulse delay [Heart Sounds Click] : no clicks [Edema] : no edema [Capillary Refill Test] : normal capillary refill [S2 Wide Splitting] : had wide splitting [Normal S1] : normal  [Systolic] : systolic [II] : a grade 2/6 [LUSB] : LUSB [No Diastolic Murmur] : no diastolic murmur was heard [Abdomen Soft] : soft [Nondistended] : nondistended [Nail Clubbing] : no clubbing  or cyanosis of the fingernails [] : no hepato-splenomegaly [Abdomen Tenderness] : non-tender [Skin Color & Pigmentation] : normal skin color and pigmentation [Demonstrated Behavior - Infant Nonreactive To Parents] : interactive [Mood] : mood and affect were appropriate for age [FreeTextEntry1] : petite

## 2023-07-17 ENCOUNTER — APPOINTMENT (OUTPATIENT)
Dept: PEDIATRIC CARDIOLOGY | Facility: CLINIC | Age: 28
End: 2023-07-17
Payer: MEDICAID

## 2023-07-17 PROCEDURE — 94681 O2 UPTK CO2 OUTP % O2 XTRC: CPT

## 2023-07-17 PROCEDURE — 93015 CV STRESS TEST SUPVJ I&R: CPT

## 2023-07-17 PROCEDURE — 94010 BREATHING CAPACITY TEST: CPT

## 2023-07-19 ENCOUNTER — NON-APPOINTMENT (OUTPATIENT)
Age: 28
End: 2023-07-19

## 2023-08-14 ENCOUNTER — APPOINTMENT (OUTPATIENT)
Dept: PEDIATRIC CARDIOLOGY | Facility: CLINIC | Age: 28
End: 2023-08-14

## 2023-10-08 ENCOUNTER — RESULT CHARGE (OUTPATIENT)
Age: 28
End: 2023-10-08

## 2023-10-09 ENCOUNTER — APPOINTMENT (OUTPATIENT)
Dept: PEDIATRIC CARDIOLOGY | Facility: CLINIC | Age: 28
End: 2023-10-09
Payer: MEDICAID

## 2023-10-09 VITALS
BODY MASS INDEX: 19.91 KG/M2 | DIASTOLIC BLOOD PRESSURE: 78 MMHG | WEIGHT: 100.09 LBS | HEIGHT: 59.45 IN | OXYGEN SATURATION: 97 % | SYSTOLIC BLOOD PRESSURE: 113 MMHG | HEART RATE: 74 BPM | RESPIRATION RATE: 20 BRPM

## 2023-10-09 PROCEDURE — 99204 OFFICE O/P NEW MOD 45 MIN: CPT

## 2023-10-09 PROCEDURE — 93303 ECHO TRANSTHORACIC: CPT

## 2023-11-16 ENCOUNTER — APPOINTMENT (OUTPATIENT)
Dept: MRI IMAGING | Facility: HOSPITAL | Age: 28
End: 2023-11-16
Payer: MEDICAID

## 2023-11-16 ENCOUNTER — OUTPATIENT (OUTPATIENT)
Dept: OUTPATIENT SERVICES | Age: 28
LOS: 1 days | End: 2023-11-16

## 2023-11-16 ENCOUNTER — RESULT REVIEW (OUTPATIENT)
Age: 28
End: 2023-11-16

## 2023-11-16 DIAGNOSIS — Q21.3 TETRALOGY OF FALLOT: ICD-10-CM

## 2023-11-16 PROCEDURE — 71555 MRI ANGIO CHEST W OR W/O DYE: CPT | Mod: 26

## 2023-11-16 PROCEDURE — 75561 CARDIAC MRI FOR MORPH W/DYE: CPT | Mod: 26

## 2025-03-05 ENCOUNTER — APPOINTMENT (OUTPATIENT)
Dept: OPHTHALMOLOGY | Facility: CLINIC | Age: 30
End: 2025-03-05